# Patient Record
Sex: FEMALE | Race: WHITE | NOT HISPANIC OR LATINO | Employment: OTHER | URBAN - METROPOLITAN AREA
[De-identification: names, ages, dates, MRNs, and addresses within clinical notes are randomized per-mention and may not be internally consistent; named-entity substitution may affect disease eponyms.]

---

## 2017-03-01 ENCOUNTER — PREPPED CHART (OUTPATIENT)
Dept: URBAN - METROPOLITAN AREA CLINIC 46 | Facility: CLINIC | Age: 77
End: 2017-03-01

## 2017-09-06 ASSESSMENT — VISUAL ACUITY
OS_SC: 20/50-2
OS_CC: J1
OD_SC: 20/70-1
OD_CC: J1
OD_CC: 20/25
OS_CC: 20/20-1
OS_SC: J5
OD_SC: J3

## 2017-09-06 ASSESSMENT — TONOMETRY
OS_IOP_MMHG: 19
OD_IOP_MMHG: 21

## 2018-03-26 ENCOUNTER — ESTABLISHED COMPREHENSIVE EXAM (OUTPATIENT)
Dept: URBAN - METROPOLITAN AREA CLINIC 46 | Facility: CLINIC | Age: 78
End: 2018-03-26

## 2018-03-26 DIAGNOSIS — H52.7: ICD-10-CM

## 2018-03-26 DIAGNOSIS — H35.3130: ICD-10-CM

## 2018-03-26 DIAGNOSIS — H25.9: ICD-10-CM

## 2018-03-26 DIAGNOSIS — H04.123: ICD-10-CM

## 2018-03-26 DIAGNOSIS — H40.023: ICD-10-CM

## 2018-03-26 PROCEDURE — 92014 COMPRE OPH EXAM EST PT 1/>: CPT

## 2018-03-26 PROCEDURE — 1036F TOBACCO NON-USER: CPT

## 2018-03-26 PROCEDURE — 92015 DETERMINE REFRACTIVE STATE: CPT

## 2018-03-26 PROCEDURE — G8428 CUR MEDS NOT DOCUMENT: HCPCS

## 2018-03-26 ASSESSMENT — VISUAL ACUITY
OD_CC: J3
OD_SC: 20/40
OD_SC: J5
OS_CC: 20/30-2
OS_SC: J5
OD_CC: 20/40-1
OS_SC: 20/30-1
OS_CC: J3

## 2018-03-26 ASSESSMENT — TONOMETRY
OS_IOP_MMHG: 19
OD_IOP_MMHG: 18

## 2018-08-24 NOTE — PATIENT DISCUSSION
Angles are narrow and at risk for occlusion. Recommending prophylactic YAG laser iridotomy to prevent any ocular complication.

## 2018-08-27 NOTE — PATIENT DISCUSSION
Angles are narrow and at risk for occlusion. Recommending prophylactic YAG laser iridotomy OU to prevent any ocular complication.

## 2018-09-05 NOTE — PROCEDURE NOTE: SURGICAL
<p>Prior to commencing surgery, patient identification, surgical procedure, site, and side were confirmed by Dr. Silvia Delgado. Following topical proparacaine anesthesia, the patient was positioned at the YAG laser, a contact lens coupled to the cornea with methylcellulose and a peripheral iridotomy placed using 10.4 Mj x 15. without complication. Excess methylcellulose was washed from the eyes, one drop of Econopred Plus 1% instilled and the patient returned to the holding area having tolerated the procedure well and without complication. </p><p>MRN:[id number]</p>

## 2019-03-27 ENCOUNTER — ESTABLISHED COMPREHENSIVE EXAM (OUTPATIENT)
Dept: URBAN - METROPOLITAN AREA CLINIC 46 | Facility: CLINIC | Age: 79
End: 2019-03-27

## 2019-03-27 DIAGNOSIS — H40.023: ICD-10-CM

## 2019-03-27 DIAGNOSIS — H25.9: ICD-10-CM

## 2019-03-27 PROCEDURE — 92015 DETERMINE REFRACTIVE STATE: CPT

## 2019-03-27 PROCEDURE — 92014 COMPRE OPH EXAM EST PT 1/>: CPT

## 2019-03-27 ASSESSMENT — VISUAL ACUITY
OD_CC: J2
OD_BAT: 20/<400
OS_CC: 20/25
OS_SC: 20/40-1
OS_CC: J1
OS_BAT: 20/<400
OD_SC: J5
OD_SC: 20/70
OD_CC: 20/30+2
OS_SC: J5

## 2019-03-27 ASSESSMENT — TONOMETRY
OS_IOP_MMHG: 19
OD_IOP_MMHG: 18

## 2020-05-28 ENCOUNTER — ESTABLISHED COMPREHENSIVE EXAM (OUTPATIENT)
Dept: URBAN - METROPOLITAN AREA CLINIC 46 | Facility: CLINIC | Age: 80
End: 2020-05-28

## 2020-05-28 DIAGNOSIS — H52.7: ICD-10-CM

## 2020-05-28 DIAGNOSIS — H40.023: ICD-10-CM

## 2020-05-28 DIAGNOSIS — H35.3130: ICD-10-CM

## 2020-05-28 PROCEDURE — 92015 DETERMINE REFRACTIVE STATE: CPT

## 2020-05-28 PROCEDURE — 92014 COMPRE OPH EXAM EST PT 1/>: CPT

## 2020-05-28 ASSESSMENT — VISUAL ACUITY
OS_CC: J2
OD_CC: J3
OS_SC: J6
OS_CC: 20/25-1
OD_CC: 20/30+2
OS_SC: 20/50-1
OD_SC: 20/40
OD_SC: J8

## 2020-05-28 ASSESSMENT — TONOMETRY
OS_IOP_MMHG: 18
OD_IOP_MMHG: 19

## 2021-08-11 ENCOUNTER — ESTABLISHED COMPREHENSIVE EXAM (OUTPATIENT)
Dept: URBAN - METROPOLITAN AREA CLINIC 46 | Facility: CLINIC | Age: 81
End: 2021-08-11

## 2021-08-11 DIAGNOSIS — H25.811: ICD-10-CM

## 2021-08-11 DIAGNOSIS — H25.812: ICD-10-CM

## 2021-08-11 DIAGNOSIS — H40.023: ICD-10-CM

## 2021-08-11 DIAGNOSIS — H04.123: ICD-10-CM

## 2021-08-11 DIAGNOSIS — H35.3130: ICD-10-CM

## 2021-08-11 DIAGNOSIS — H52.7: ICD-10-CM

## 2021-08-11 PROCEDURE — 92014 COMPRE OPH EXAM EST PT 1/>: CPT

## 2021-08-11 PROCEDURE — 92015 DETERMINE REFRACTIVE STATE: CPT

## 2021-08-11 ASSESSMENT — VISUAL ACUITY
OS_CC: J3
OD_CC: J5
OS_SC: J8
OD_SC: 20/70+2
OD_SC: J10
OS_CC: 20/25+1
OS_SC: 20/60+2
OD_CC: 20/40+2

## 2021-08-11 ASSESSMENT — TONOMETRY
OS_IOP_MMHG: 12
OD_IOP_MMHG: 13

## 2022-08-11 ENCOUNTER — COMPREHENSIVE EXAM (OUTPATIENT)
Dept: URBAN - METROPOLITAN AREA CLINIC 46 | Facility: CLINIC | Age: 82
End: 2022-08-11

## 2022-08-11 DIAGNOSIS — H52.7: ICD-10-CM

## 2022-08-11 DIAGNOSIS — H25.813: ICD-10-CM

## 2022-08-11 DIAGNOSIS — H35.3130: ICD-10-CM

## 2022-08-11 DIAGNOSIS — H04.123: ICD-10-CM

## 2022-08-11 DIAGNOSIS — H40.023: ICD-10-CM

## 2022-08-11 PROCEDURE — 92133 CPTRZD OPH DX IMG PST SGM ON: CPT

## 2022-08-11 PROCEDURE — 92014 COMPRE OPH EXAM EST PT 1/>: CPT

## 2022-08-11 PROCEDURE — 92015 DETERMINE REFRACTIVE STATE: CPT

## 2022-08-11 ASSESSMENT — VISUAL ACUITY
OD_SC: J6
OD_CC: 20/40
OD_SC: 20/70
OD_CC: J5
OS_SC: J5
OS_CC: J3
OS_SC: 20/40+2
OS_CC: 20/25-2

## 2022-08-11 ASSESSMENT — TONOMETRY
OD_IOP_MMHG: 16
OS_IOP_MMHG: 18

## 2023-12-04 ENCOUNTER — COMPREHENSIVE EXAM (OUTPATIENT)
Dept: URBAN - METROPOLITAN AREA CLINIC 46 | Facility: CLINIC | Age: 83
End: 2023-12-04

## 2023-12-04 DIAGNOSIS — H40.023: ICD-10-CM

## 2023-12-04 DIAGNOSIS — H40.013: ICD-10-CM

## 2023-12-04 DIAGNOSIS — H04.123: ICD-10-CM

## 2023-12-04 DIAGNOSIS — H35.3130: ICD-10-CM

## 2023-12-04 DIAGNOSIS — H25.813: ICD-10-CM

## 2023-12-04 DIAGNOSIS — H52.7: ICD-10-CM

## 2023-12-04 PROCEDURE — 92015 DETERMINE REFRACTIVE STATE: CPT

## 2023-12-04 PROCEDURE — 92014 COMPRE OPH EXAM EST PT 1/>: CPT

## 2023-12-04 ASSESSMENT — VISUAL ACUITY
OD_BAT: 20/400
OS_BAT: 20/200
OS_CC: 20/25+2
OD_CC: J2
OS_SC: 20/40+2
OS_CC: J1+
OD_CC: 20/40+2
OD_SC: 20/60-1

## 2023-12-04 ASSESSMENT — TONOMETRY
OS_IOP_MMHG: 18
OD_IOP_MMHG: 18

## 2024-01-11 ENCOUNTER — CONSULTATION/EVALUATION (OUTPATIENT)
Dept: URBAN - METROPOLITAN AREA CLINIC 43 | Facility: CLINIC | Age: 84
End: 2024-01-11

## 2024-01-11 DIAGNOSIS — H40.013: ICD-10-CM

## 2024-01-11 DIAGNOSIS — H25.813: ICD-10-CM

## 2024-01-11 DIAGNOSIS — H04.123: ICD-10-CM

## 2024-01-11 PROCEDURE — 92136 OPHTHALMIC BIOMETRY: CPT

## 2024-01-11 PROCEDURE — 99204 OFFICE O/P NEW MOD 45 MIN: CPT

## 2024-01-11 PROCEDURE — 92025-3 CORNEAL TOPO, REFUSED

## 2024-01-11 RX ORDER — PREDNISOLONE ACETATE 10 MG/ML: 1 SUSPENSION/ DROPS OPHTHALMIC

## 2024-01-11 RX ORDER — NEPAFENAC 3 MG/ML: 1 SUSPENSION/ DROPS OPHTHALMIC ONCE A DAY

## 2024-01-11 RX ORDER — MOXIFLOXACIN HYDROCHLORIDE 5 MG/ML: 1 SOLUTION/ DROPS OPHTHALMIC

## 2024-01-11 ASSESSMENT — VISUAL ACUITY
OS_CC: 20/40+1
OD_SC: 20/70
OD_CC: 20/50+1
OS_SC: 20/60-1
OD_SC: J10
OS_SC: J12
OS_CC: J4
OD_RAM: 20/20
OD_BAT: 20/400
OS_BAT: 20/200
OD_CC: J4

## 2024-01-11 ASSESSMENT — TONOMETRY
OD_IOP_MMHG: 15
OS_IOP_MMHG: 15

## 2024-01-22 ENCOUNTER — PREPPED CHART (OUTPATIENT)
Dept: URBAN - METROPOLITAN AREA CLINIC 39 | Facility: CLINIC | Age: 84
End: 2024-01-22

## 2024-01-22 ENCOUNTER — SURGERY/PROCEDURE (OUTPATIENT)
Dept: URBAN - METROPOLITAN AREA SURGERY 14 | Facility: SURGERY | Age: 84
End: 2024-01-22

## 2024-01-22 DIAGNOSIS — H25.813: ICD-10-CM

## 2024-01-22 DIAGNOSIS — H40.013: ICD-10-CM

## 2024-01-22 DIAGNOSIS — H04.123: ICD-10-CM

## 2024-01-22 PROCEDURE — 99211HP PRE-OP

## 2024-01-22 PROCEDURE — 66984 XCAPSL CTRC RMVL W/O ECP: CPT

## 2024-01-26 ENCOUNTER — POST OP/EVAL OF SECOND EYE (OUTPATIENT)
Dept: URBAN - METROPOLITAN AREA CLINIC 46 | Facility: CLINIC | Age: 84
End: 2024-01-26

## 2024-01-26 DIAGNOSIS — Z98.890: ICD-10-CM

## 2024-01-26 DIAGNOSIS — H04.123: ICD-10-CM

## 2024-01-26 DIAGNOSIS — H40.013: ICD-10-CM

## 2024-01-26 DIAGNOSIS — H25.811: ICD-10-CM

## 2024-01-26 DIAGNOSIS — Z96.1: ICD-10-CM

## 2024-01-26 PROCEDURE — 99024 POSTOP FOLLOW-UP VISIT: CPT

## 2024-01-26 PROCEDURE — 99213 OFFICE O/P EST LOW 20 MIN: CPT

## 2024-01-26 ASSESSMENT — TONOMETRY
OS_IOP_MMHG: 16
OD_IOP_MMHG: 14

## 2024-01-26 ASSESSMENT — VISUAL ACUITY
OD_BAT: 20/>400
OS_SC: 20/40
OS_PH: 20/25-2
OS_SC: J3
OD_SC: 20/200
OD_SC: J10

## 2024-01-29 ENCOUNTER — PRE-OP/H&P (OUTPATIENT)
Dept: URBAN - METROPOLITAN AREA CLINIC 39 | Facility: CLINIC | Age: 84
End: 2024-01-29

## 2024-01-29 ENCOUNTER — SURGERY/PROCEDURE (OUTPATIENT)
Dept: URBAN - METROPOLITAN AREA SURGERY 14 | Facility: SURGERY | Age: 84
End: 2024-01-29

## 2024-01-29 DIAGNOSIS — H25.811: ICD-10-CM

## 2024-01-29 DIAGNOSIS — Z96.1: ICD-10-CM

## 2024-01-29 PROCEDURE — 99211HP PRE-OP

## 2024-01-29 PROCEDURE — 66984 XCAPSL CTRC RMVL W/O ECP: CPT

## 2024-01-30 ENCOUNTER — POST-OP (OUTPATIENT)
Dept: URBAN - METROPOLITAN AREA CLINIC 46 | Facility: CLINIC | Age: 84
End: 2024-01-30

## 2024-01-30 DIAGNOSIS — Z96.1: ICD-10-CM

## 2024-01-30 PROCEDURE — 99024 POSTOP FOLLOW-UP VISIT: CPT

## 2024-01-30 ASSESSMENT — VISUAL ACUITY
OS_PH: 20/20
OS_SC: 20/50
OD_SC: J3
OD_SC: 20/50-1
OD_PH: 20/20
OS_SC: J3

## 2024-01-30 ASSESSMENT — TONOMETRY
OD_IOP_MMHG: 19
OS_IOP_MMHG: 16

## 2024-02-26 ENCOUNTER — POST-OP (OUTPATIENT)
Dept: URBAN - METROPOLITAN AREA CLINIC 46 | Facility: CLINIC | Age: 84
End: 2024-02-26

## 2024-02-26 DIAGNOSIS — Z96.1: ICD-10-CM

## 2024-02-26 PROCEDURE — 99024 POSTOP FOLLOW-UP VISIT: CPT

## 2024-02-26 ASSESSMENT — TONOMETRY
OD_IOP_MMHG: 16
OS_IOP_MMHG: 14

## 2024-02-26 ASSESSMENT — VISUAL ACUITY
OS_PH: 20/60+2
OD_PH: 20/25-1
OD_SC: J3
OS_SC: J3
OS_SC: 20/60
OD_SC: 20/50

## 2024-12-12 ENCOUNTER — COMPREHENSIVE EXAM (OUTPATIENT)
Age: 84
End: 2024-12-12

## 2024-12-12 DIAGNOSIS — H17.9: ICD-10-CM

## 2024-12-12 DIAGNOSIS — H26.493: ICD-10-CM

## 2024-12-12 DIAGNOSIS — H52.7: ICD-10-CM

## 2024-12-12 DIAGNOSIS — Z96.1: ICD-10-CM

## 2024-12-12 DIAGNOSIS — H40.013: ICD-10-CM

## 2024-12-12 DIAGNOSIS — H04.123: ICD-10-CM

## 2024-12-12 PROCEDURE — 92014 COMPRE OPH EXAM EST PT 1/>: CPT

## 2024-12-12 PROCEDURE — 92015 DETERMINE REFRACTIVE STATE: CPT
